# Patient Record
(demographics unavailable — no encounter records)

---

## 2024-10-30 NOTE — HISTORY OF PRESENT ILLNESS
[FreeTextEntry1] : Primary care physician: Dr. Christoph Gonzalez, [facility], [place].  10/25/24: 62-year-old male presents for follow up.  Taking Flomax. Reports reasonable stream, daytime frequency 3-4 x and nocturia 2 x.  Denies dysuria, hematuria, lower abdominal or flank pain, fever, chills or rigors. History of Kidney Stones requiring Ureteroscopy. Last RBUS in 2021 showed no stones.  Last PSA 1.63 (10/27/23).   In the past: On Flomax. Urinated with reasonable stream, 3-4 x or so during the day and nocturia 2 x.  Denied.  Hesitancy, straining, intermittency, urgency, incontinence, sense of incomplete emptying.  With Tadalafil 5 mg PRN got good response.  Had difficulty attaining erections. Rated erections as 3/5. Reported normal libido.  Had family history of Prostate cancer: Father.   Had Ankle replacement with revision surgery.  Status post Cystoscopy and right ureteral stent placement by Dr Olson on 2/20/2020 at [facility].  Status post right ureteroscopy, laser lithotripsy, stone extraction and ureteral stent exchange done on 3/17/2020 at [facility].  Stone analysis: 100% Calcium oxalate monohydrate. Cystoscopy and right ureteral stent removal 3/27/2020.  Was 1st episode of kidney stone, no family history.

## 2024-10-30 NOTE — LETTER BODY
[Dear  ___] : Dear  [unfilled], [Courtesy Letter:] : I had the pleasure of seeing your patient, [unfilled], in my office today. [Please see my note below.] : Please see my note below. [Sincerely,] : Sincerely, [FreeTextEntry3] : Giancarlo Blackwell MD  of Urology [facility]  [facility] Offices: [address] [facility] [place], [address] [place] [address], [facility] [address] [facility], [place] [address]  TEL: [phone] FAX: [phone]

## 2024-10-30 NOTE — HISTORY OF PRESENT ILLNESS
[FreeTextEntry1] : Primary care physician: Dr. Christoph Gonzalez, [facility], [place].  10/25/24: 62-year-old male presents for follow up.  Taking Flomax. Reports reasonable stream, daytime frequency 3-4 x and nocturia 2 x.  Denies dysuria, hematuria, lower abdominal or flank pain, fever, chills or rigors. History of Kidney Stones requiring Ureteroscopy. Last RBUS in 2021 showed no stones.  Last PSA 1.63 (10/27/23).   In the past: On Flomax. Urinated with reasonable stream, 3-4 x or so during the day and nocturia 2 x.  Denied.  Hesitancy, straining, intermittency, urgency, incontinence, sense of incomplete emptying.  With Tadalafil 5 mg PRN got good response.  Had difficulty attaining erections. Rated erections as 3/5. Reported normal libido.  Had family history of Prostate cancer: Father.   Had Ankle replacement with revision surgery.  Status post Cystoscopy and right ureteral stent placement by Dr Olson on 2/20/2020 at [facility].  Status post right ureteroscopy, laser lithotripsy, stone extraction and ureteral stent exchange done on 3/17/2020 at [facility].  Stone analysis: 100% Calcium oxalate monohydrate. Cystoscopy and right ureteral stent removal 3/27/2020.  Was 1st episode of kidney stone, no family history.

## 2024-10-30 NOTE — PHYSICAL EXAM
[Urethral Meatus] : meatus normal [Penis Abnormality] : normal circumcised penis [Scrotum] : the scrotum was normal [Epididymis] : the epididymides were normal [Testes Tenderness] : no tenderness of the testes [Testes Mass (___cm)] : there were no testicular masses [Prostate Tenderness] : the prostate was not tender [No Prostate Nodules] : no prostate nodules [Prostate Size ___ (0-4)] : prostate size [unfilled] (scale: 0-4) [Normal Appearance] : normal appearance [Well Groomed] : well groomed [General Appearance - In No Acute Distress] : no acute distress [] : no respiratory distress [Respiration, Rhythm And Depth] : normal respiratory rhythm and effort [Exaggerated Use Of Accessory Muscles For Inspiration] : no accessory muscle use [Abdomen Soft] : soft [Abdomen Tenderness] : non-tender [Costovertebral Angle Tenderness] : no ~M costovertebral angle tenderness [Urinary Bladder Findings] : the bladder was normal on palpation [Normal Station and Gait] : the gait and station were normal for the patient's age [No Focal Deficits] : no focal deficits [Oriented To Time, Place, And Person] : oriented to person, place, and time [Affect] : the affect was normal [Mood] : the mood was normal [FreeTextEntry1] : normal peripheral circulation

## 2024-10-30 NOTE — END OF VISIT
[Time Spent: ___ minutes] : I have spent [unfilled] minutes of time on the encounter which excludes teaching and separately reported services. [FreeTextEntry3] : Patient was seen with Nurse Practitioner and examined by me. Agree with above note, where necessary edits were made.  Parts of this note were generated by using Dragon medical dictation software.  A reasonable effort was made to proofread and correct its contents, but typos and mistakes may still remain.If there are any questions or points of clarification needed, please contact my office.   Prior to appointment and during encounter with patient extensive medical records were reviewed including but not limited to, hospital records, outpatient records, imaging results and lab data if available. During this appointment the patient was examined, diagnoses were discussed and explained in a face-to-face manner. In addition, extensive time was spent reviewing aforementioned diagnostic studies. Counseling including test results, differential diagnoses, treatment options, risk and benefits, lifestyle changes, current condition, and current medications was performed. Patient's questions and concerns were addressed. Patient verbalized understanding of the treatment plan. Time spent is for reviewing chart, labs and images if available, counseling and care coordination.

## 2024-10-30 NOTE — ASSESSMENT
[FreeTextEntry1] : PSA screening: Discussed PSA screening and latest recommendations/guidelines: USPTF and AUA. Mentioned as per comprehensive review and meta-analysis digital rectal exam exhibited a notably low diagnostic value for prostate cancer detection. With suggestion that it could be potentially omitted. Patient was agreeable with not doing digital rectal examination.  Obtained PSA today.   Benign Prostatic Hyperplasia: Incomplete Bladder Emptying: See uroflow. PVR: 153ml.  Did not have to urinate Discussed treatment options, will continue Flomax.  Will get renal and bladder ultrasound.  History of Kidney Stones: Will review renal and Bladder Ultrasound.   Will inform results. Return to office in 6 months or sooner if any issues: will do uroflow and check post void residual.

## 2024-10-30 NOTE — LETTER BODY
[Dear  ___] : Dear  [unfilled], [Courtesy Letter:] : I had the pleasure of seeing your patient, [unfilled], in my office today. [Please see my note below.] : Please see my note below. [Sincerely,] : Sincerely, [FreeTextEntry3] : Giancarlo Blackwell MD  of Urology [facility]  [facility] Offices: [address] [facility], [address] [place] [address], [facility] [address] [facility], [place] [address]  TEL: [phone] FAX: [phone]

## 2024-11-13 NOTE — DISCUSSION/SUMMARY
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  Plan: 1. Physical Therapy/home exercise program.  ASO brace given to support his arch.  Good arch support shoes at all times. 2. NSAIDs/Tylenol as needed for pain.  Meloxicam 15 mg sent to his pharmacy to use as directed, as needed for pain. 3. Return to normal activities as tolerated.  4. Continue with ice and heat therapy.  5. All questions answered.  Follow-up as needed. If pain persists consider advanced imaging in the future.  The patient understood the treatment plan.   The patient's current medication management of their orthopedic diagnosis was reviewed today: (1) We discussed a comprehensive treatment plan that included possible pharmaceutical management involving the use of prescription strength medications including but not limited to options such as oral Ibuprofen 400mg QID, once daily Meloxicam 15 mg, or 500-650 mg Tylenol versus over the counter oral medications and topical prescription NSAID Pennsaid vs over the counter Voltaren gel. (2) There is a moderate risk of morbidity with further treatment, especially from use of prescription strength medications and possible side effects of these medications which include upset stomach with oral medications, skin reactions to topical medications and cardiac/renal issues with long term use. (3) I recommended that the patient follow-up with their medical physician to discuss any significant specific potential issues with long term medication use such as interactions with current medications or with exacerbation of underlying medical comorbidities. (4) The benefits and risks associated with use of injectable, oral or topical, prescription and over the counter anti-inflammatory medications were discussed with the patient. The patient voiced understanding of the risks including but not limited to bleeding, stroke, kidney dysfunction, heart disease, and were referred to the black box warning label for further information.

## 2024-11-13 NOTE — HISTORY OF PRESENT ILLNESS
[FreeTextEntry1] : 11/13/2024: JM KWON is a 62-year-old male presenting to the office for an initial evaluation of right ankle pain.  He reports that the pain is acute on chronic pain mostly on the medial side of his ankle and midfoot.  The patient does have a flatfoot with a collapsed arch on the right side.  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  No trauma associated with his right ankle pain.  Patient walking with a slight limp because of the ankle pain today.  Increased pain with standing and working for long periods of time.  Pain scale in the office today 3 out of 10.  Pain at worst 7 out of 10.  No other complaints.

## 2024-11-13 NOTE — PHYSICAL EXAM
[de-identified] : Right ankle Physical Examination:   General: Alert and oriented x3.  In no acute distress.  Pleasant in nature with a normal affect.  No apparent respiratory distress. Erythema, Warmth, Rubor: Negative Swelling: Mild medial ankle swelling.   ROM: 1. Dorsiflexion: 10 degrees 2. Plantarflexion: 40 degrees 3. 10 degrees of subtalar motion 4. Inversion: 20 degrees 5. Eversion: 20 degrees   Tenderness to Palpation: 1. Lateral Malleolus: Negative 2. Medial Malleolus: Negative 3. Proximal Fibular Pain: Negative 4. Heel Pain: Negative 5. Cuboid: Negative 6. Navicular: Negative 7. Tibiotalar Joint: Negative 8. Subtalar Joint: Negative 9. Posterior Recess: Negative   Tendon Pain: 1. Achilles: Negative 2. Peroneals: Negative 3. Posterior Tibialis: + 4. Tibialis Anterior: Negative   Ligament Pain: 1. ATFL: Negative 2. CFL: Negative 3. PTFL: Negative 4. Deltoid Ligaments: + 5. Lis Franc Ligament: Negative   Stability: 1. Anterior Drawer: Negative 2. Posterior Drawer: Negative   Strength: 5/5 TA/GS/EHL   Pulses: 2+ DP/PT Pulses   Neuro: Intact motor and sensory   Additional Test: 1. Calcaneal Squeeze Test: Negative 2. Syndesmosis Squeeze Test: Negative 3. Sheets Test: Negative  [de-identified] : 3V of the right ankle were ordered, obtained and reviewed by me today, 11/13/2024, and revealed: Midfoot arthritic changes seen.  Arch collapse.  No fractures present.